# Patient Record
Sex: FEMALE | Race: WHITE
[De-identification: names, ages, dates, MRNs, and addresses within clinical notes are randomized per-mention and may not be internally consistent; named-entity substitution may affect disease eponyms.]

---

## 2022-05-31 LAB
BUN BLD-MCNC: 19 MG/DL (ref 7–18)
GLUCOSE SERPLBLD-MCNC: 84 MG/DL (ref 74–106)
HCT VFR BLD CALC: 43.9 % (ref 36–45)
INR BLD: 0.97
LYMPHOCYTES # SPEC AUTO: 2.3 K/UL (ref 0.7–4.9)
PMV BLD: 8.2 FL (ref 7.6–11.3)
POTASSIUM SERPL-SCNC: 3.9 MMOL/L (ref 3.5–5.1)
RBC # BLD: 4.71 M/UL (ref 3.86–4.86)

## 2022-05-31 NOTE — RAD REPORT
EXAM DESCRIPTION:  RAD - Chest Pa And Lat (2 Views) - 5/31/2022 12:34 pm

 

CLINICAL HISTORY:  pre cath procedure

Chest pain.

 

COMPARISON:  Chest Single View dated 1/7/2018; Chest Single View dated 1/5/2018; Chest Single View da
ash 1/4/2018

 

FINDINGS:  The lungs are clear. The heart is normal in size. Very prominent dextroscoliosis of the th
oracic spine.

## 2022-06-01 NOTE — EKG
Test Date:    2022-05-31               Test Time:    12:13:32

Technician:   GERALD                                     

                                                     

MEASUREMENT RESULTS:                                       

Intervals:                                           

Rate:         77                                     

GA:           134                                    

QRSD:         144                                    

QT:           438                                    

QTc:          495                                    

Axis:                                                

P:            59                                     

GA:           134                                    

QRS:          -32                                    

T:            63                                     

                                                     

INTERPRETIVE STATEMENTS:                                       

                                                     

Normal sinus rhythm

Possible Left atrial enlargement

Left axis deviation

Left bundle branch block

Abnormal ECG

Compared to ECG 01/05/2018 09:08:40

Left-axis deviation now present



Electronically Signed On 06-01-22 12:59:49 CDT by Valerio Mccoy

## 2022-06-02 ENCOUNTER — HOSPITAL ENCOUNTER (OUTPATIENT)
Dept: HOSPITAL 97 - CCL | Age: 67
Discharge: HOME | End: 2022-06-02
Attending: INTERNAL MEDICINE
Payer: COMMERCIAL

## 2022-06-02 VITALS — DIASTOLIC BLOOD PRESSURE: 58 MMHG | OXYGEN SATURATION: 98 % | SYSTOLIC BLOOD PRESSURE: 125 MMHG

## 2022-06-02 DIAGNOSIS — Z82.49: ICD-10-CM

## 2022-06-02 DIAGNOSIS — I65.22: ICD-10-CM

## 2022-06-02 DIAGNOSIS — I50.21: Primary | ICD-10-CM

## 2022-06-02 DIAGNOSIS — J44.9: ICD-10-CM

## 2022-06-02 DIAGNOSIS — Z87.891: ICD-10-CM

## 2022-06-02 DIAGNOSIS — Z20.822: ICD-10-CM

## 2022-06-02 DIAGNOSIS — I10: ICD-10-CM

## 2022-06-02 DIAGNOSIS — Z88.0: ICD-10-CM

## 2022-06-02 DIAGNOSIS — E78.2: ICD-10-CM

## 2022-06-02 DIAGNOSIS — Z79.899: ICD-10-CM

## 2022-06-02 PROCEDURE — 93005 ELECTROCARDIOGRAM TRACING: CPT

## 2022-06-02 PROCEDURE — 36415 COLL VENOUS BLD VENIPUNCTURE: CPT

## 2022-06-02 PROCEDURE — 85610 PROTHROMBIN TIME: CPT

## 2022-06-02 PROCEDURE — 80048 BASIC METABOLIC PNL TOTAL CA: CPT

## 2022-06-02 PROCEDURE — 71046 X-RAY EXAM CHEST 2 VIEWS: CPT

## 2022-06-02 PROCEDURE — 85025 COMPLETE CBC W/AUTO DIFF WBC: CPT

## 2022-06-02 PROCEDURE — 85730 THROMBOPLASTIN TIME PARTIAL: CPT

## 2022-06-02 PROCEDURE — 93458 L HRT ARTERY/VENTRICLE ANGIO: CPT

## 2022-06-02 NOTE — OP
Surgeon:  Valerio Mccoy MD



Assistant:  Ms. Tori Kim. 



The patient will be at bedrest for 2 hours and then she will go home and I will see her in the office
 in 2 weeks.



Admitted to my service as an outpatient on 06/02/2022 to the cath lab.



Procedures:  Left heart catheterization, selective coronary arteriogram, and common femoral artery an
giogram.



Indication:  Abnormal stress test, abnormal echocardiogram.



Procedure In Detail:  The patient was prepped and draped in routine sterile fashion.  Given some fent
anyl and Versed for sedation.  She was draped.  A 6-[place] sheath introduced in the right common femo
ral artery successfully using the Seldinger technique and 10 cc of Xylocaine.  Common femoral arteria
l angiogram was normal.  Angio-Seal was used to close the case.  Jaun catheter left and right were
 used to cannulate the left main and right main respectively.  She was found to have perfectly normal
 coronaries.  She was left dominant.  A pigtail catheter was introduced in the left ventricle and lef
t ventricular end-diastolic pressure was 4.  However, she had an ejection fraction about 35% to 40% c
onsistent with her echo.  Total conscious sedation was 45 minutes.  No complications.  Blood loss was
 5 mL.



Postoperative Diagnosis:  Congestive heart failure, acute, systolic.



Plan:  Plan for medical therapy.





NB/ASHLEY

DD:  06/02/2022 10:17:03Voice ID:  [id number]

DT:  06/02/2022 20:07:31Report ID:  [id number]